# Patient Record
Sex: FEMALE | Race: WHITE
[De-identification: names, ages, dates, MRNs, and addresses within clinical notes are randomized per-mention and may not be internally consistent; named-entity substitution may affect disease eponyms.]

---

## 2019-02-08 ENCOUNTER — HOSPITAL ENCOUNTER (OUTPATIENT)
Dept: HOSPITAL 92 - SCSMAMMO | Age: 64
Discharge: HOME | End: 2019-02-08
Attending: OBSTETRICS & GYNECOLOGY
Payer: COMMERCIAL

## 2019-02-08 DIAGNOSIS — Z12.31: Primary | ICD-10-CM

## 2019-02-08 PROCEDURE — 77067 SCR MAMMO BI INCL CAD: CPT

## 2019-02-21 NOTE — MMO
BILATERAL SCREENING MAMMOGRAM:

 

Date:  02/08/19 

 

HISTORY:  

63-year-old female. Routine screening mammography. 

 

COMPARISON:  

04/29/14, 05/29/14, 08/24/15, and 08/24/17. 

 

TECHNIQUE:  

CC and MLO views of both breasts are submitted for interpretation. 

 

This patient's mammogram was reviewed with the assistance of computer-aided detection.  

 

FINDINGS:

The breasts are composed of scattered fibroglandular tissue. Bilaterally, no suspicious dominant mass
, architectural distortion, or suspicious calcifications. Bilateral benign-appearing calcifications. 
Bilateral nodularity. When compared to the previous exam, no significant interval change. 

 

IMPRESSION: 

BIRADS 2:  Benign Finding(s)

 

RECOMMENDATION:

Annual mammogram.  

 

POS: [facility]RO